# Patient Record
Sex: FEMALE | Race: WHITE | NOT HISPANIC OR LATINO | Employment: FULL TIME | URBAN - METROPOLITAN AREA
[De-identification: names, ages, dates, MRNs, and addresses within clinical notes are randomized per-mention and may not be internally consistent; named-entity substitution may affect disease eponyms.]

---

## 2024-01-30 ENCOUNTER — HOSPITAL ENCOUNTER (OUTPATIENT)
Dept: RADIOLOGY | Facility: CLINIC | Age: 44
Discharge: HOME | End: 2024-01-30

## 2024-01-30 ENCOUNTER — OFFICE VISIT (OUTPATIENT)
Dept: ORTHOPEDIC SURGERY | Facility: CLINIC | Age: 44
End: 2024-01-30

## 2024-01-30 DIAGNOSIS — S80.00XA CONTUSION OF KNEE, INITIAL ENCOUNTER: ICD-10-CM

## 2024-01-30 DIAGNOSIS — S80.00XA CONTUSION OF KNEE, INITIAL ENCOUNTER: Primary | ICD-10-CM

## 2024-01-30 PROCEDURE — 1036F TOBACCO NON-USER: CPT | Performed by: FAMILY MEDICINE

## 2024-01-30 PROCEDURE — 99203 OFFICE O/P NEW LOW 30 MIN: CPT | Performed by: FAMILY MEDICINE

## 2024-01-30 PROCEDURE — 73564 X-RAY EXAM KNEE 4 OR MORE: CPT | Mod: RT

## 2024-01-30 PROCEDURE — 99213 OFFICE O/P EST LOW 20 MIN: CPT | Mod: 27 | Performed by: FAMILY MEDICINE

## 2024-01-30 PROCEDURE — 73562 X-RAY EXAM OF KNEE 3: CPT | Mod: RIGHT SIDE | Performed by: FAMILY MEDICINE

## 2024-01-30 NOTE — PROGRESS NOTES
Acute Injury New Patient Visit    CC:   Chief Complaint   Patient presents with    Right Knee - Pain     Rt knee pain  Xrays today  Fell over curb hit knee       HPI: Ivone is a 43 y.o.female who presents today with new complaints of complaints of pain discomfort to the front of the right knee.  She states that she had tripped and fell over a curb and hit her knee into the ground.  She scraped it and bruised it she has full ability to move and weight-bear she does want to get it checked out to make sure there is no significant injury.  She presents here today for further evaluation.        Review of Systems   GENERAL: Negative for malaise, significant weight loss, fever  MUSCULOSKELETAL: See HPI  NEURO: Negative for numbness / tingling     Past Medical History  History reviewed. No pertinent past medical history.    Medication review  Medication Documentation Review Audit       Reviewed by Cole C Budinsky, MD (Physician) on 01/30/24 at 0930      Medication Order Taking? Sig Documenting Provider Last Dose Status            No Medications to Display                                   Allergies  Not on File    Social History  Social History     Socioeconomic History    Marital status: Single     Spouse name: Not on file    Number of children: Not on file    Years of education: Not on file    Highest education level: Not on file   Occupational History    Not on file   Tobacco Use    Smoking status: Never    Smokeless tobacco: Never   Substance and Sexual Activity    Alcohol use: Not on file    Drug use: Not on file    Sexual activity: Not on file   Other Topics Concern    Not on file   Social History Narrative    Not on file     Social Determinants of Health     Financial Resource Strain: Not on file   Food Insecurity: Not on file   Transportation Needs: Not on file   Physical Activity: Not on file   Stress: Not on file   Social Connections: Not on file   Intimate Partner Violence: Not on file   Housing Stability: Not  on file       Surgical History  Past Surgical History:   Procedure Laterality Date    OTHER SURGICAL HISTORY  01/05/2021    Breast augmentation       Physical Exam:  GENERAL:  Patient is awake, alert, and oriented to person place and time.  Patient appears well nourished and well kept.  Affect Calm, Not Acutely Distressed.  HEENT:  Normocephalic, Atraumatic, EOMI  CARDIOVASCULAR:  Hemodynamically stable.  RESPIRATORY:  Normal respirations with unlabored breathing.  NEURO: Gross sensation intact to the lower extremities bilaterally.  Extremity: Right knee exam demonstrates minimal soft tissue swelling superficial bruise irritation to the anterior medial aspect of the patella.  There is no tenderness to the medial or lateral joint line she has a full intact extensor mechanism full flexion about the knee no laxity with valgus or varus stress negative Liz and Apley test.  Calf is soft nontender.  Mild tightness of the hamstrings.      Diagnostics: X-rays were negative for fracture or dislocation        Procedure: None  Procedures    Assessment:   Problem List Items Addressed This Visit    None  Visit Diagnoses       Contusion of knee, initial encounter    -  Primary    Relevant Orders    XR knee right 4+ views             Plan: At this time we will offer the patient as needed follow-up versus a potential 7 to 10-day follow-up with any persistent pain or discomfort.  She can use topical moisturizer or skin cream over the abrasion in addition to ice and over-the-counter Tylenol anti-inflammatories for pain control.  Will also give her home exercises work on gentle range of motion and strength recovery.  She should call or return with any issues.  She is given a work note for today.  Orders Placed This Encounter    XR knee right 4+ views      At the conclusion of the visit there were no further questions by the patient/family regarding their plan of care.  Patient was instructed to call or return with any issues,  questions, or concerns regarding their injury and/or treatment plan described above.     01/30/24 at 9:31 AM - Cole C Budinsky, MD    Office: [phone]    This note was prepared using voice recognition software.  The details of this note are correct and have been reviewed, and corrected to the best of my ability.  Some grammatical errors may persist related to the Dragon software.

## 2024-01-30 NOTE — LETTER
January 30, 2024     Patient: Ivone Lau   YOB: 1980   Date of Visit: 1/30/2024       To Whom It May Concern:    It is my medical opinion that Ivone Lau may return to work on 01/30/24 .    If you have any questions or concerns, please don't hesitate to call.         Sincerely,        Cole C Budinsky, MD